# Patient Record
Sex: FEMALE | Race: BLACK OR AFRICAN AMERICAN | NOT HISPANIC OR LATINO | Employment: UNEMPLOYED | URBAN - METROPOLITAN AREA
[De-identification: names, ages, dates, MRNs, and addresses within clinical notes are randomized per-mention and may not be internally consistent; named-entity substitution may affect disease eponyms.]

---

## 2017-09-13 ENCOUNTER — HOSPITAL ENCOUNTER (EMERGENCY)
Facility: OTHER | Age: 32
Discharge: HOME | End: 2017-09-13
Attending: EMERGENCY MEDICINE

## 2017-09-13 VITALS
WEIGHT: 220 LBS | OXYGEN SATURATION: 100 % | RESPIRATION RATE: 18 BRPM | HEIGHT: 63 IN | BODY MASS INDEX: 38.98 KG/M2 | HEART RATE: 99 BPM | SYSTOLIC BLOOD PRESSURE: 121 MMHG | TEMPERATURE: 99 F | DIASTOLIC BLOOD PRESSURE: 87 MMHG

## 2017-09-13 DIAGNOSIS — J06.9 UPPER RESPIRATORY INFECTION WITH COUGH AND CONGESTION: Primary | ICD-10-CM

## 2017-09-13 PROCEDURE — 63600175 PHARM REV CODE 636 W HCPCS: Performed by: EMERGENCY MEDICINE

## 2017-09-13 PROCEDURE — 99283 EMERGENCY DEPT VISIT LOW MDM: CPT | Mod: 25

## 2017-09-13 PROCEDURE — 96372 THER/PROPH/DIAG INJ SC/IM: CPT

## 2017-09-13 RX ORDER — DEXAMETHASONE SODIUM PHOSPHATE 4 MG/ML
4 INJECTION, SOLUTION INTRA-ARTICULAR; INTRALESIONAL; INTRAMUSCULAR; INTRAVENOUS; SOFT TISSUE
Status: COMPLETED | OUTPATIENT
Start: 2017-09-13 | End: 2017-09-13

## 2017-09-13 RX ORDER — AMOXICILLIN 500 MG/1
500 CAPSULE ORAL 3 TIMES DAILY
Qty: 21 CAPSULE | Refills: 0 | Status: SHIPPED | OUTPATIENT
Start: 2017-09-13 | End: 2017-09-20

## 2017-09-13 RX ORDER — BENZONATATE 100 MG/1
100 CAPSULE ORAL 3 TIMES DAILY PRN
Qty: 15 CAPSULE | Refills: 0 | Status: SHIPPED | OUTPATIENT
Start: 2017-09-13 | End: 2017-09-23

## 2017-09-13 RX ADMIN — DEXAMETHASONE SODIUM PHOSPHATE 4 MG: 4 INJECTION, SOLUTION INTRAMUSCULAR; INTRAVENOUS at 07:09

## 2017-09-13 NOTE — ED PROVIDER NOTES
Encounter Date: 9/13/2017       History     Chief Complaint   Patient presents with    URI     +cough/productive yellow, + sore throat, + subjective fever since last night     Ms Ross tried 2 days of zyrtec with no relief. Began as congestion and sore throat, cough that worsens at night and makes her chest hurt is now biggest complaint.  Adriano was recently diagnosed with bronchitis and required antibiotics to get better, per patient.  She still able to perform her normal ADLs.      The history is provided by the patient.   URI   The primary symptoms include fever, sore throat and cough. Primary symptoms do not include abdominal pain, nausea, vomiting, myalgias, arthralgias or rash. The current episode started two days ago. This is a new problem. The problem has been gradually worsening. The fever began yesterday. The fever has been unchanged since its onset. Temperature source: subjective.   The sore throat began yesterday. The sore throat has been unchanged since its onset. The sore throat is not accompanied by trouble swallowing, drooling, hoarse voice or stridor.   The cough began yesterday. The cough is productive. There is nondescript sputum produced. Cough worsened by: worse at night.   The onset of the illness is associated with exposure to sick contacts. Symptoms associated with the illness include chills, sinus pressure and congestion. The illness is not associated with plugged ear sensation, facial pain or rhinorrhea.     Review of patient's allergies indicates:  No Known Allergies  Past Medical History:   Diagnosis Date    Polycystic disease, ovaries      Past Surgical History:   Procedure Laterality Date    CHOLECYSTECTOMY       History reviewed. No pertinent family history.  Social History   Substance Use Topics    Smoking status: Never Smoker    Smokeless tobacco: Never Used    Alcohol use Yes      Comment: occasional     Review of Systems   Constitutional: Positive for chills and fever.   HENT:  Positive for congestion, sinus pressure and sore throat. Negative for drooling, hoarse voice, rhinorrhea and trouble swallowing.    Respiratory: Positive for cough. Negative for stridor.    Gastrointestinal: Negative for abdominal pain, nausea and vomiting.   Musculoskeletal: Negative for arthralgias and myalgias.   Skin: Negative for rash.   All other systems reviewed and are negative.      Physical Exam     Initial Vitals [09/13/17 0722]   BP Pulse Resp Temp SpO2   (!) 126/93 101 18 99.7 °F (37.6 °C) 99 %      MAP       104         Physical Exam    Nursing note and vitals reviewed.  Constitutional: She appears well-developed and well-nourished. She is not diaphoretic. No distress.   HENT:   Head: Normocephalic and atraumatic.   Left Ear: External ear normal.   Nose: Nose normal.   Mouth/Throat: Oropharynx is clear and moist. No oropharyngeal exudate.   Right ear with canal and TM erythema but no effusion.  Good light reflex.  Congestion noted.  Mild right turbinate swelling compared with left.   Eyes: Conjunctivae and EOM are normal. Pupils are equal, round, and reactive to light. Right eye exhibits no discharge. Left eye exhibits no discharge.   Neck: Normal range of motion. Neck supple.   Cardiovascular: Normal rate, regular rhythm and normal heart sounds.   Pulmonary/Chest: Breath sounds normal. No respiratory distress. She has no wheezes. She has no rales. She exhibits no tenderness.   Abdominal: Soft. She exhibits no distension. There is no tenderness.   Musculoskeletal: Normal range of motion. She exhibits tenderness. She exhibits no edema.   Neurological: She is alert and oriented to person, place, and time. No cranial nerve deficit or sensory deficit.   Skin: Skin is warm and dry. Capillary refill takes less than 2 seconds. No rash noted. No erythema.   Psychiatric: She has a normal mood and affect. Thought content normal.         ED Course   Procedures  Labs Reviewed - No data to display          Medical  Decision Making:   ED Management:  Ms. Ross has a clinical URI.  Given recent sick contact that required antibiotics for improvement, it is appropriate to give in this setting.  Mr. Reyes is stable for discharge.  Decadron given in ER.  We discussed worrisome signs that should probably need to return to the ER should occur.  There is no indication for further emergent intervention or evaluation this time.  Of note, Mr. Ross declined chest x-ray, and I agree with this plan.                   ED Course      Clinical Impression:   The encounter diagnosis was Upper respiratory infection with cough and congestion.                           Lizzie Connors MD  09/13/17 0754       Lizzie Connors MD  09/13/17 0754

## 2017-09-13 NOTE — ED NOTES
Patient has verified the spelling of their name and  on armband    LOC: The patient is awake, alert, and aware of environment with an appropriate affect, the patient is oriented x 4 and speaking appropriately.     APPEARANCE: Patient resting comfortably and in no acute distress, patient is clean and well groomed, patient's clothing is properly fastened.     RESPIRATORY: Airway is open and patent, denies SOB     CARDIAC:  No chest pain.     HEENT: c/o productive cough, sore throat.     COMPLAINT: Patient complain of sore throat, wheezing and coughing x 1 day.

## 2017-10-11 ENCOUNTER — HOSPITAL ENCOUNTER (EMERGENCY)
Facility: HOSPITAL | Age: 32
Discharge: HOME | End: 2017-10-11

## 2017-10-11 VITALS
DIASTOLIC BLOOD PRESSURE: 78 MMHG | OXYGEN SATURATION: 100 % | WEIGHT: 220 LBS | HEIGHT: 62 IN | HEART RATE: 80 BPM | BODY MASS INDEX: 40.48 KG/M2 | TEMPERATURE: 98 F | RESPIRATION RATE: 18 BRPM | SYSTOLIC BLOOD PRESSURE: 138 MMHG

## 2017-10-11 DIAGNOSIS — R11.0 NAUSEA: ICD-10-CM

## 2017-10-11 DIAGNOSIS — Z34.90 PREGNANCY, UNSPECIFIED GESTATIONAL AGE: Primary | ICD-10-CM

## 2017-10-11 LAB
B-HCG UR QL: POSITIVE
BACTERIA #/AREA URNS AUTO: ABNORMAL /HPF
BILIRUB UR QL STRIP: NEGATIVE
CLARITY UR REFRACT.AUTO: CLEAR
COLOR UR AUTO: YELLOW
GLUCOSE UR QL STRIP: NEGATIVE
HGB UR QL STRIP: NEGATIVE
KETONES UR QL STRIP: NEGATIVE
LEUKOCYTE ESTERASE UR QL STRIP: ABNORMAL
MICROSCOPIC COMMENT: ABNORMAL
NITRITE UR QL STRIP: NEGATIVE
PH UR STRIP: 6 [PH] (ref 5–8)
PROT UR QL STRIP: ABNORMAL
RBC #/AREA URNS AUTO: 1 /HPF (ref 0–4)
SP GR UR STRIP: 1.02 (ref 1–1.03)
URN SPEC COLLECT METH UR: ABNORMAL
UROBILINOGEN UR STRIP-ACNC: NEGATIVE EU/DL
WBC #/AREA URNS AUTO: 3 /HPF (ref 0–5)
WBC CLUMPS UR QL AUTO: ABNORMAL

## 2017-10-11 PROCEDURE — 81025 URINE PREGNANCY TEST: CPT

## 2017-10-11 PROCEDURE — 81000 URINALYSIS NONAUTO W/SCOPE: CPT

## 2017-10-11 PROCEDURE — 99283 EMERGENCY DEPT VISIT LOW MDM: CPT

## 2017-10-11 RX ORDER — ONDANSETRON 4 MG/1
4 TABLET, FILM COATED ORAL EVERY 6 HOURS
Qty: 12 TABLET | Refills: 0 | Status: SHIPPED | OUTPATIENT
Start: 2017-10-11

## 2017-10-11 NOTE — ED NOTES
Pt assisted into bed and pt identifiers verified.     Appearance: Pt is calm and cooperative, clothing fastened appropriately, no distress noted.   Neuro: Pt is A&Ox3. Speech is clear. Pt able to move all extremities without difficulty.   Respiratory: Airway is open and patent. Respirations are even and unlabored on room air. No accessory muscle use noted.   Cardiac: Peripheral pulses palpated +2 in all extremities. No peripheral edema noted. Cap refill is <3 seconds.   HEENT: Eyes are clear of drainage with no visual loss per pt. No drainage noted to ears or nose. Mouth and teeth are intact with no abnormalities noted. Throat and neck appear normal with no abnormalities noted.   Gastrointestinal: Pt c/o abdominal cramping today. +nausea x1 week.   : Pt denies burning, pain, frequency, or rentention with urination. Denies vaginal/penile discharge.   Skin: Skin is intact.   Musculoskeletal: No abnormalities noted.

## 2017-10-11 NOTE — ED PROVIDER NOTES
Encounter Date: 10/11/2017       History     Chief Complaint   Patient presents with    Possible Pregnancy     Pt states has had nausea x 1 week and cramping today, denies vaginal bleeding.  States took 2 home UPT, with one positive result and one negative result.      32-year-old female presents to emergency room complaining of nausea times one week and abdominal cramping while at work today.  Patient states she took a pregnancy test twice yesterday and one test was positive.  States last menses was at the end of July.  States abdominal pain is now resolved, nausea continues.  Patient has not taken anything for nausea.  States she has a OB follow-up in 2 weeks.  Denies any vaginal bleeding.  Denies any vaginal discharge.  Denies any urinary symptoms.          Review of patient's allergies indicates:  No Known Allergies  Past Medical History:   Diagnosis Date    Polycystic disease, ovaries      Past Surgical History:   Procedure Laterality Date    CHOLECYSTECTOMY       Family History   Problem Relation Age of Onset    No Known Problems Mother     No Known Problems Father      Social History   Substance Use Topics    Smoking status: Never Smoker    Smokeless tobacco: Never Used    Alcohol use Yes      Comment: occasional     Review of Systems   Constitutional: Negative for fever.   HENT: Negative for sore throat.    Respiratory: Negative for shortness of breath.    Cardiovascular: Negative for chest pain.   Gastrointestinal: Positive for abdominal pain. Negative for nausea.        Positive pregnancy test   Genitourinary: Negative for dysuria.   Musculoskeletal: Negative for back pain.   Skin: Negative for rash.   Neurological: Negative for weakness.   Hematological: Does not bruise/bleed easily.   All other systems reviewed and are negative.      Physical Exam     Initial Vitals [10/11/17 1628]   BP Pulse Resp Temp SpO2   138/78 80 18 97.8 °F (36.6 °C) 100 %      MAP       98         Physical Exam    Nursing  note and vitals reviewed.  Constitutional: She appears well-developed and well-nourished. No distress.   HENT:   Head: Normocephalic.   Eyes: Conjunctivae are normal.   Neck: Normal range of motion. Neck supple.   Cardiovascular: Normal rate.   Pulmonary/Chest: Breath sounds normal. No respiratory distress.   Abdominal: Soft. Bowel sounds are normal. She exhibits no distension and no abdominal bruit. There is no tenderness. There is no rebound and no guarding. No hernia.   Neurological: She is alert and oriented to person, place, and time.   Skin: Skin is warm and dry.   Psychiatric: She has a normal mood and affect. Her behavior is normal. Judgment and thought content normal.         ED Course   Procedures  Labs Reviewed   URINALYSIS - Abnormal; Notable for the following:        Result Value    Protein, UA Trace (*)     Leukocytes, UA 1+ (*)     All other components within normal limits   PREGNANCY TEST, URINE RAPID   URINALYSIS MICROSCOPIC             Medical Decision Making:   Initial Assessment:   32-year-old female presents to emergency room complaining of nausea times one week and abdominal cramping while at work today.  Patient states she took a pregnancy test twice yesterday and one test was positive.  States last menses was at the end of July.  States abdominal pain is now resolved, nausea continues.  Patient has not taken anything for nausea.  States she has a OB follow-up in 2 weeks.  Denies any vaginal bleeding.  Denies any vaginal discharge.  Denies any urinary symptoms.  Abdomen is soft nontender.  Patient does not appear to be in any distress or discomfort.  Obese abdomen.  Mucosal membranes are pink and moist.  Lungs sounds are clear.  Differential Diagnosis:   Pregnancy, ectopic pregnancy, menses, constipation, viral syndrome, STI, UTI  Clinical Tests:   Lab Tests: Ordered and Reviewed  ED Management:  Patient informed positive pregnancy test.  Patient denies any abdominal pain at this time.   Instructed to keep OB follow-up.  If pain returns or bleeding presents return to the emergency department.  I'll prescribe Zofran for nausea.  Patient instructed to take Zofran only as needed.  Hydrate well.              Attending Attestation:     Physician Attestation Statement for NP/PA:   I discussed this assessment and plan of this patient with the NP/PA, but I did not personally examine the patient. The face to face encounter was performed by the NP/PA.    Other NP/PA Attestation Additions:    History of Present Illness: 10/21/17 - attempted to contact pt for phone followup, left message on answering machine  10/25/17 - unable to contact pt on 10/23 as well.  NP will attempt phone followup today                   ED Course      Clinical Impression:   The primary encounter diagnosis was Pregnancy, unspecified gestational age. A diagnosis of Nausea was also pertinent to this visit.                           Jessica Shaikh NP  10/13/17 1659       Murtaza Rincon MD  10/25/17 1480

## 2017-10-25 NOTE — PROVIDER PROGRESS NOTES - EMERGENCY DEPT.
Encounter Date: 10/11/2017    ED Physician Progress Notes        Physician Note:   Spoke with Pt on 10/25 she has followed up with PCP and OB and has not had any additional abdominal pain. IUP confirmed today by OB/GYN.        MIMAS - noted